# Patient Record
Sex: MALE | Race: WHITE | NOT HISPANIC OR LATINO | Employment: OTHER | URBAN - METROPOLITAN AREA
[De-identification: names, ages, dates, MRNs, and addresses within clinical notes are randomized per-mention and may not be internally consistent; named-entity substitution may affect disease eponyms.]

---

## 2017-09-13 ENCOUNTER — GENERIC CONVERSION - ENCOUNTER (OUTPATIENT)
Dept: OTHER | Facility: OTHER | Age: 72
End: 2017-09-13

## 2018-01-09 ENCOUNTER — APPOINTMENT (OUTPATIENT)
Dept: LAB | Facility: MEDICAL CENTER | Age: 73
End: 2018-01-09
Payer: COMMERCIAL

## 2018-01-09 ENCOUNTER — TRANSCRIBE ORDERS (OUTPATIENT)
Dept: ADMINISTRATIVE | Facility: HOSPITAL | Age: 73
End: 2018-01-09

## 2018-01-09 DIAGNOSIS — E03.9 MYXEDEMA HEART DISEASE: ICD-10-CM

## 2018-01-09 DIAGNOSIS — E78.2 MIXED HYPERLIPIDEMIA: ICD-10-CM

## 2018-01-09 DIAGNOSIS — I51.9 MYXEDEMA HEART DISEASE: ICD-10-CM

## 2018-01-09 DIAGNOSIS — E78.2 MIXED HYPERLIPIDEMIA: Primary | ICD-10-CM

## 2018-01-09 LAB
ALBUMIN SERPL BCP-MCNC: 3.8 G/DL (ref 3.5–5)
ALP SERPL-CCNC: 86 U/L (ref 46–116)
ALT SERPL W P-5'-P-CCNC: 25 U/L (ref 12–78)
ANION GAP SERPL CALCULATED.3IONS-SCNC: 4 MMOL/L (ref 4–13)
AST SERPL W P-5'-P-CCNC: 19 U/L (ref 5–45)
BILIRUB SERPL-MCNC: 0.48 MG/DL (ref 0.2–1)
BUN SERPL-MCNC: 15 MG/DL (ref 5–25)
CALCIUM SERPL-MCNC: 9.4 MG/DL (ref 8.3–10.1)
CHLORIDE SERPL-SCNC: 106 MMOL/L (ref 100–108)
CHOLEST SERPL-MCNC: 190 MG/DL (ref 50–200)
CO2 SERPL-SCNC: 30 MMOL/L (ref 21–32)
CREAT SERPL-MCNC: 1.37 MG/DL (ref 0.6–1.3)
GFR SERPL CREATININE-BSD FRML MDRD: 50 ML/MIN/1.73SQ M
GLUCOSE P FAST SERPL-MCNC: 94 MG/DL (ref 65–99)
HDLC SERPL-MCNC: 47 MG/DL (ref 40–60)
LDLC SERPL CALC-MCNC: 127 MG/DL (ref 0–100)
POTASSIUM SERPL-SCNC: 5 MMOL/L (ref 3.5–5.3)
PROT SERPL-MCNC: 7.5 G/DL (ref 6.4–8.2)
PSA SERPL-MCNC: 1 NG/ML (ref 0–4)
SODIUM SERPL-SCNC: 140 MMOL/L (ref 136–145)
TRIGL SERPL-MCNC: 82 MG/DL
TSH SERPL DL<=0.05 MIU/L-ACNC: 2.26 UIU/ML (ref 0.36–3.74)

## 2018-01-09 PROCEDURE — 84443 ASSAY THYROID STIM HORMONE: CPT

## 2018-01-09 PROCEDURE — 36415 COLL VENOUS BLD VENIPUNCTURE: CPT

## 2018-01-09 PROCEDURE — 80061 LIPID PANEL: CPT

## 2018-01-09 PROCEDURE — 80053 COMPREHEN METABOLIC PANEL: CPT

## 2018-01-09 PROCEDURE — G0103 PSA SCREENING: HCPCS

## 2018-01-15 NOTE — MISCELLANEOUS
Message   Recorded as Task   Date: 09/13/2017 10:17 AM, Created By: Isai Rdz   Task Name: Miscellaneous   Assigned To: Brian TIJERINA,TEAM   Regarding Patient: Dorys Stark, Status: Active   CommentDanilsa Rodriguez - 13 Sep 2017 10:17 AM     TASK CREATED  Caller: Self; Other; [phone] (Home)  ELIECER FROM [facility] REC[facility] DEPARTMENT CALLED SAYING SHE RECIEVED A REQUEST FROM ANGEL AND THE DATE OF BIRTH IS WRONG  PLEASE REFAX THE REQUEST WITH HIS CORRECT DATE OF BIRTH  Donavon Fontanez NUMBER [phone] IF ANY QUESTIONS CALL [phone]   Michelle Estrada - 13 Sep 2017 10:23 AM     TASK EDITED  RE-FAXED        Active Problems    1  Bladder cancer (188 9) (C67 9)   2  Incomplete emptying of bladder (788 21) (R33 9)    Current Meds   1  Synthroid 88 MCG Oral Tablet (Levothyroxine Sodium); Therapy: (Recorded:[id number]) to Recorded    Allergies    1   No Known Drug Allergies    Signatures   Electronically signed by : Jorden Velarde RN; Sep 13 2017 10:23AM EST                       (Author)

## 2018-03-02 ENCOUNTER — APPOINTMENT (OUTPATIENT)
Dept: LAB | Facility: MEDICAL CENTER | Age: 73
End: 2018-03-02
Attending: UROLOGY
Payer: COMMERCIAL

## 2018-03-02 ENCOUNTER — TRANSCRIBE ORDERS (OUTPATIENT)
Dept: ADMINISTRATIVE | Facility: HOSPITAL | Age: 73
End: 2018-03-02

## 2018-03-02 DIAGNOSIS — Z85.46 PERSONAL HISTORY OF MALIGNANT NEOPLASM OF PROSTATE: ICD-10-CM

## 2018-03-02 LAB — PSA SERPL-MCNC: 0.7 NG/ML (ref 0–4)

## 2018-03-02 PROCEDURE — 84153 ASSAY OF PSA TOTAL: CPT

## 2018-03-02 PROCEDURE — 36415 COLL VENOUS BLD VENIPUNCTURE: CPT

## 2018-03-13 DIAGNOSIS — Z85.46 PERSONAL HISTORY OF MALIGNANT NEOPLASM OF PROSTATE: ICD-10-CM

## 2018-03-13 RX ORDER — RANITIDINE 150 MG/1
150 TABLET ORAL
COMMUNITY
End: 2021-11-03 | Stop reason: ALTCHOICE

## 2018-03-13 RX ORDER — LEVOTHYROXINE SODIUM 88 UG/1
88 TABLET ORAL
COMMUNITY

## 2018-03-13 RX ORDER — PEDI MULTIVIT NO.91/IRON FUM 15 MG
1 TABLET,CHEWABLE ORAL
COMMUNITY
End: 2021-11-03 | Stop reason: ALTCHOICE

## 2018-03-15 ENCOUNTER — PROCEDURE VISIT (OUTPATIENT)
Dept: UROLOGY | Facility: MEDICAL CENTER | Age: 73
End: 2018-03-15
Payer: COMMERCIAL

## 2018-03-15 VITALS
HEIGHT: 70 IN | BODY MASS INDEX: 26.05 KG/M2 | DIASTOLIC BLOOD PRESSURE: 86 MMHG | WEIGHT: 182 LBS | SYSTOLIC BLOOD PRESSURE: 145 MMHG

## 2018-03-15 DIAGNOSIS — Z85.51 HISTORY OF BLADDER CANCER: Primary | ICD-10-CM

## 2018-03-15 DIAGNOSIS — Z85.46 HISTORY OF PROSTATE CANCER: ICD-10-CM

## 2018-03-15 LAB
SL AMB  POCT GLUCOSE, UA: ABNORMAL
SL AMB LEUKOCYTE ESTERASE,UA: ABNORMAL
SL AMB POCT BILIRUBIN,UA: ABNORMAL
SL AMB POCT BLOOD,UA: ABNORMAL
SL AMB POCT CLARITY,UA: CLEAR
SL AMB POCT COLOR,UA: YELLOW
SL AMB POCT KETONES,UA: ABNORMAL
SL AMB POCT NITRITE,UA: ABNORMAL
SL AMB POCT PH,UA: 5.5
SL AMB POCT SPECIFIC GRAVITY,UA: 1
SL AMB POCT URINE PROTEIN: ABNORMAL
SL AMB POCT UROBILINOGEN: 0.2

## 2018-03-15 PROCEDURE — 4040F PNEUMOC VAC/ADMIN/RCVD: CPT | Performed by: UROLOGY

## 2018-03-15 PROCEDURE — 81003 URINALYSIS AUTO W/O SCOPE: CPT | Performed by: UROLOGY

## 2018-03-15 PROCEDURE — 99214 OFFICE O/P EST MOD 30 MIN: CPT | Performed by: UROLOGY

## 2018-03-15 PROCEDURE — 52000 CYSTOURETHROSCOPY: CPT | Performed by: UROLOGY

## 2018-03-15 NOTE — PROGRESS NOTES
KEFLEX 500MG WAS GIVEN TO Pt  PRIOR TO CYSTO PROCEDURE  LOT# [id number]  EXP: 08/2019  NDC# 8013-6112-09  CONSENT FORM SIGNED

## 2018-03-15 NOTE — PROGRESS NOTES
Assessment/Plan:  1  Doing well  No recurrence of either condition  2   Cysto and PSA one year  No problem-specific Assessment & Plan notes found for this encounter  Diagnoses and all orders for this visit:    History of bladder cancer  -     POCT urine dip auto non-scope    History of prostate cancer  -     PSA Total, Diagnostic; Future    Other orders  -     levothyroxine 88 mcg tablet; Take 88 mcg by mouth  -     pediatric multivitamin-iron (POLY-VI-SOL with IRON) 15 MG chewable tablet; Chew 1 tablet  -     ranitidine (ZANTAC) 150 mg tablet; Take 150 mg by mouth          Subjective:      Patient ID: Kemp Meigs is a 74 y o  male  1   Now six years out from XRT for localized prostate cancer  Has done quite well  PSA 1 0 two months ago and then a second one was just this month that was one  0 7    Five years out from TUR bladder tumor superficial noninvasive  Has had no recurrences  No urinary symptoms at all decent stream control nocturia        The following portions of the patient's history were reviewed and updated as appropriate: allergies, current medications, past family history, past medical history, past social history, past surgical history and problem list     Review of Systems   All other systems reviewed and are negative  Objective:      /86 (BP Location: Left arm, Patient Position: Sitting)   Ht 5' 10" (1 778 m)   Wt 82 6 kg (182 lb)   BMI 26 11 kg/m²          Physical Exam   Constitutional: He appears well-developed and well-nourished  Pulmonary/Chest: Effort normal and breath sounds normal  No respiratory distress  He has no wheezes  Abdominal: Soft  Bowel sounds are normal  He exhibits no distension  There is no tenderness     Genitourinary: Testes normal and penis normal    Genitourinary Comments: Prostate minimally enlarged no nodules     Cystoscopy  Date/Time: 3/15/2018 1:43 PM  Performed by: Jason Grant  Authorized by: Jason Grant     Procedure details: cystoscopy    Additional Procedure Details: The patient was carefully  positioned supine on the examining table  Sterile preparation was performed on the urethra  Xylocaine jelly was instilled and left  Indwelling for the procedure  The 13 Macedonian flexible cystoscope was passed with the following findings:      Urethra:  No strictures    Prostate:  Mild enlargement and median lobe    Bladder:  Mild radiation vascular changes, mild trabeculations  No tumors or stones     Residual urine:  100 mL    Patient tolerated the procedure well and was escorted from the examining table

## 2018-03-15 NOTE — LETTER
March 15, 2018     [facility] [place] [id number]    Patient: Al Jose J   YOB: 1945   Date of Visit: 3/15/2018     Dear Dr Charlotte Osorio      Thank you for referring Jordyn Conner to me for evaluation  Below are the relevant portions of my assessment and plan of care  If you have questions, please do not hesitate to call me  I look forward to following Sharyn Campbell along with you           Sincerely,        Leni Stokes MD        CC: No Recipients    Progress Notes:

## 2018-07-09 ENCOUNTER — TRANSCRIBE ORDERS (OUTPATIENT)
Dept: ADMINISTRATIVE | Facility: HOSPITAL | Age: 73
End: 2018-07-09

## 2018-07-09 ENCOUNTER — APPOINTMENT (OUTPATIENT)
Dept: LAB | Facility: MEDICAL CENTER | Age: 73
End: 2018-07-09
Payer: COMMERCIAL

## 2018-07-09 DIAGNOSIS — K21.9 GASTROESOPHAGEAL REFLUX DISEASE WITHOUT ESOPHAGITIS: ICD-10-CM

## 2018-07-09 DIAGNOSIS — C61 MALIGNANT NEOPLASM PROSTATE (HCC): Primary | ICD-10-CM

## 2018-07-09 DIAGNOSIS — C61 MALIGNANT NEOPLASM PROSTATE (HCC): ICD-10-CM

## 2018-07-09 DIAGNOSIS — C67.9 MALIGNANT NEOPLASM OF URINARY BLADDER, UNSPECIFIED SITE (HCC): ICD-10-CM

## 2018-07-09 DIAGNOSIS — E03.9 HYPOTHYROIDISM, UNSPECIFIED TYPE: ICD-10-CM

## 2018-07-09 DIAGNOSIS — E78.5 HYPERLIPIDEMIA, UNSPECIFIED HYPERLIPIDEMIA TYPE: ICD-10-CM

## 2018-07-09 LAB
ALBUMIN SERPL BCP-MCNC: 3.8 G/DL (ref 3.5–5)
ALP SERPL-CCNC: 73 U/L (ref 46–116)
ALT SERPL W P-5'-P-CCNC: 23 U/L (ref 12–78)
ANION GAP SERPL CALCULATED.3IONS-SCNC: 8 MMOL/L (ref 4–13)
AST SERPL W P-5'-P-CCNC: 21 U/L (ref 5–45)
BASOPHILS # BLD AUTO: 0.04 THOUSANDS/ΜL (ref 0–0.1)
BASOPHILS NFR BLD AUTO: 1 % (ref 0–1)
BILIRUB SERPL-MCNC: 0.6 MG/DL (ref 0.2–1)
BUN SERPL-MCNC: 26 MG/DL (ref 5–25)
CALCIUM SERPL-MCNC: 8.8 MG/DL (ref 8.3–10.1)
CHLORIDE SERPL-SCNC: 105 MMOL/L (ref 100–108)
CHOLEST SERPL-MCNC: 192 MG/DL (ref 50–200)
CO2 SERPL-SCNC: 27 MMOL/L (ref 21–32)
CREAT SERPL-MCNC: 1.45 MG/DL (ref 0.6–1.3)
EOSINOPHIL # BLD AUTO: 0.11 THOUSAND/ΜL (ref 0–0.61)
EOSINOPHIL NFR BLD AUTO: 2 % (ref 0–6)
ERYTHROCYTE [DISTWIDTH] IN BLOOD BY AUTOMATED COUNT: 13.3 % (ref 11.6–15.1)
GFR SERPL CREATININE-BSD FRML MDRD: 47 ML/MIN/1.73SQ M
GLUCOSE P FAST SERPL-MCNC: 95 MG/DL (ref 65–99)
HCT VFR BLD AUTO: 41.1 % (ref 36.5–49.3)
HDLC SERPL-MCNC: 44 MG/DL (ref 40–60)
HGB BLD-MCNC: 13.1 G/DL (ref 12–17)
IMM GRANULOCYTES # BLD AUTO: 0.02 THOUSAND/UL (ref 0–0.2)
IMM GRANULOCYTES NFR BLD AUTO: 0 % (ref 0–2)
LDLC SERPL CALC-MCNC: 131 MG/DL (ref 0–100)
LYMPHOCYTES # BLD AUTO: 1.84 THOUSANDS/ΜL (ref 0.6–4.47)
LYMPHOCYTES NFR BLD AUTO: 33 % (ref 14–44)
MCH RBC QN AUTO: 31.6 PG (ref 26.8–34.3)
MCHC RBC AUTO-ENTMCNC: 31.9 G/DL (ref 31.4–37.4)
MCV RBC AUTO: 99 FL (ref 82–98)
MONOCYTES # BLD AUTO: 0.59 THOUSAND/ΜL (ref 0.17–1.22)
MONOCYTES NFR BLD AUTO: 11 % (ref 4–12)
NEUTROPHILS # BLD AUTO: 2.94 THOUSANDS/ΜL (ref 1.85–7.62)
NEUTS SEG NFR BLD AUTO: 53 % (ref 43–75)
NONHDLC SERPL-MCNC: 148 MG/DL
NRBC BLD AUTO-RTO: 0 /100 WBCS
PLATELET # BLD AUTO: 265 THOUSANDS/UL (ref 149–390)
PMV BLD AUTO: 11.7 FL (ref 8.9–12.7)
POTASSIUM SERPL-SCNC: 4.5 MMOL/L (ref 3.5–5.3)
PROT SERPL-MCNC: 7.1 G/DL (ref 6.4–8.2)
PSA SERPL-MCNC: 0.8 NG/ML (ref 0–4)
RBC # BLD AUTO: 4.14 MILLION/UL (ref 3.88–5.62)
SODIUM SERPL-SCNC: 140 MMOL/L (ref 136–145)
TRIGL SERPL-MCNC: 86 MG/DL
TSH SERPL DL<=0.05 MIU/L-ACNC: 0.71 UIU/ML (ref 0.36–3.74)
WBC # BLD AUTO: 5.54 THOUSAND/UL (ref 4.31–10.16)

## 2018-07-09 PROCEDURE — 80053 COMPREHEN METABOLIC PANEL: CPT

## 2018-07-09 PROCEDURE — 80061 LIPID PANEL: CPT

## 2018-07-09 PROCEDURE — 85025 COMPLETE CBC W/AUTO DIFF WBC: CPT

## 2018-07-09 PROCEDURE — 84443 ASSAY THYROID STIM HORMONE: CPT

## 2018-07-09 PROCEDURE — G0103 PSA SCREENING: HCPCS

## 2018-07-09 PROCEDURE — 36415 COLL VENOUS BLD VENIPUNCTURE: CPT

## 2019-03-27 ENCOUNTER — PROCEDURE VISIT (OUTPATIENT)
Dept: UROLOGY | Facility: MEDICAL CENTER | Age: 74
End: 2019-03-27
Payer: COMMERCIAL

## 2019-03-27 VITALS
SYSTOLIC BLOOD PRESSURE: 136 MMHG | HEIGHT: 70 IN | HEART RATE: 82 BPM | DIASTOLIC BLOOD PRESSURE: 82 MMHG | WEIGHT: 180 LBS | BODY MASS INDEX: 25.77 KG/M2

## 2019-03-27 DIAGNOSIS — N13.8 BPH WITH URINARY OBSTRUCTION: ICD-10-CM

## 2019-03-27 DIAGNOSIS — Z85.51 HISTORY OF BLADDER CANCER: Primary | ICD-10-CM

## 2019-03-27 DIAGNOSIS — N40.1 BPH WITH URINARY OBSTRUCTION: ICD-10-CM

## 2019-03-27 LAB
SL AMB  POCT GLUCOSE, UA: NEGATIVE
SL AMB LEUKOCYTE ESTERASE,UA: NEGATIVE
SL AMB POCT BILIRUBIN,UA: NEGATIVE
SL AMB POCT BLOOD,UA: NEGATIVE
SL AMB POCT CLARITY,UA: CLEAR
SL AMB POCT COLOR,UA: YELLOW
SL AMB POCT KETONES,UA: NEGATIVE
SL AMB POCT NITRITE,UA: NEGATIVE
SL AMB POCT PH,UA: 7
SL AMB POCT SPECIFIC GRAVITY,UA: 1.02
SL AMB POCT URINE PROTEIN: NEGATIVE
SL AMB POCT UROBILINOGEN: 0.2

## 2019-03-27 PROCEDURE — 81003 URINALYSIS AUTO W/O SCOPE: CPT | Performed by: UROLOGY

## 2019-03-27 PROCEDURE — 52000 CYSTOURETHROSCOPY: CPT | Performed by: UROLOGY

## 2019-03-27 PROCEDURE — 99213 OFFICE O/P EST LOW 20 MIN: CPT | Performed by: UROLOGY

## 2019-03-27 NOTE — LETTER
March 27, 2019     [facility] [place] [phone]    Patient: Zoila Sosa   YOB: 1945   Date of Visit: 3/27/2019       Dear Dr Sheela Ortega: Thank you for referring Kamila Yu to me for evaluation  Below are my notes for this consultation  If you have questions, please do not hesitate to call me  I look forward to following your patient along with you  Sincerely,        Adrain Phalen, MD        CC: No Recipients  Adrain Phalen, MD  3/27/2019  3:12 PM  Sign at close encounter  Assessment/Plan:  Cysto negative for bladder tumor recurrence, moderate BPH  Follow-up cysto one year, we will do this until it has been 10 years since the tumor  No problem-specific Assessment & Plan notes found for this encounter  Diagnoses and all orders for this visit:    History of bladder cancer  -     POCT urine dip auto non-scope    BPH with urinary obstruction          Subjective:      Patient ID: Zoila Sosa is a 74 y o  male  1  Follow-up for bladder cancer, 2012 had a superficial high-grade noninvasive tumor  Cysto does all negative to date since that time  2  Mild BPH, nocturia times 3-4, but he says he is not bothered by getting up at night  PSA 1 6 recently      The following portions of the patient's history were reviewed and updated as appropriate: allergies, current medications, past family history, past medical history, past social history, past surgical history and problem list     Review of Systems   All other systems reviewed and are negative  Objective:      /82 (BP Location: Left arm, Patient Position: Sitting, Cuff Size: Standard)   Pulse 82   Ht 5' 10" (1 778 m)   Wt 81 6 kg (180 lb)   BMI 25 83 kg/m²           Physical Exam   Constitutional: He is oriented to person, place, and time  He appears well-developed and well-nourished  No distress  HENT:   Head: Normocephalic and atraumatic     Eyes: Conjunctivae are normal  Cardiovascular: Normal rate and regular rhythm  Pulmonary/Chest: Effort normal and breath sounds normal  No respiratory distress  He has no wheezes  Abdominal: Soft  Bowel sounds are normal  He exhibits no distension and no mass  There is no tenderness  Genitourinary:   Genitourinary Comments: Penis and testes normal   Neurological: He is alert and oriented to person, place, and time  Skin: Skin is warm and dry  He is not diaphoretic  Cystoscopy  Date/Time: 3/27/2019 3:12 PM  Performed by: John Moore MD  Authorized by: John Moore MD     Procedure details: cystoscopy    Additional Procedure Details: The patient was carefully  positioned supine on the examining table  Sterile preparation was performed on the urethra  Xylocaine jelly was instilled and left  Indwelling for the procedure  The 13 Ugandan flexible cystoscope was passed with the following findings:      Urethra:  No strictures    Prostate: Moderate enlargement, minimal median lobe    Bladder:  Severe trabeculations, no lesions tumors stones     Residual urine:  100 mL    Patient tolerated the procedure well and was escorted from the examining table

## 2021-03-31 ENCOUNTER — IMMUNIZATIONS (OUTPATIENT)
Dept: FAMILY MEDICINE CLINIC | Facility: HOSPITAL | Age: 76
End: 2021-03-31

## 2021-03-31 DIAGNOSIS — Z23 ENCOUNTER FOR IMMUNIZATION: Primary | ICD-10-CM

## 2021-03-31 PROCEDURE — 91301 SARS-COV-2 / COVID-19 MRNA VACCINE (MODERNA) 100 MCG: CPT

## 2021-03-31 PROCEDURE — 0011A SARS-COV-2 / COVID-19 MRNA VACCINE (MODERNA) 100 MCG: CPT

## 2021-04-30 ENCOUNTER — IMMUNIZATIONS (OUTPATIENT)
Dept: FAMILY MEDICINE CLINIC | Facility: HOSPITAL | Age: 76
End: 2021-04-30

## 2021-04-30 DIAGNOSIS — Z23 ENCOUNTER FOR IMMUNIZATION: Primary | ICD-10-CM

## 2021-04-30 PROCEDURE — 91301 SARS-COV-2 / COVID-19 MRNA VACCINE (MODERNA) 100 MCG: CPT

## 2021-04-30 PROCEDURE — 0012A SARS-COV-2 / COVID-19 MRNA VACCINE (MODERNA) 100 MCG: CPT

## 2021-11-08 ENCOUNTER — PROCEDURE VISIT (OUTPATIENT)
Dept: UROLOGY | Facility: MEDICAL CENTER | Age: 76
End: 2021-11-08
Payer: COMMERCIAL

## 2021-11-08 VITALS
DIASTOLIC BLOOD PRESSURE: 80 MMHG | HEIGHT: 70 IN | WEIGHT: 179.2 LBS | SYSTOLIC BLOOD PRESSURE: 140 MMHG | BODY MASS INDEX: 25.65 KG/M2 | HEART RATE: 74 BPM

## 2021-11-08 DIAGNOSIS — Z85.46 HISTORY OF PROSTATE CANCER: ICD-10-CM

## 2021-11-08 DIAGNOSIS — Z85.51 HISTORY OF BLADDER CANCER: Primary | ICD-10-CM

## 2021-11-08 LAB
SL AMB  POCT GLUCOSE, UA: ABNORMAL
SL AMB LEUKOCYTE ESTERASE,UA: ABNORMAL
SL AMB POCT BILIRUBIN,UA: ABNORMAL
SL AMB POCT BLOOD,UA: ABNORMAL
SL AMB POCT CLARITY,UA: CLEAR
SL AMB POCT COLOR,UA: YELLOW
SL AMB POCT KETONES,UA: ABNORMAL
SL AMB POCT NITRITE,UA: ABNORMAL
SL AMB POCT PH,UA: 5.5
SL AMB POCT SPECIFIC GRAVITY,UA: 1.03
SL AMB POCT URINE PROTEIN: ABNORMAL
SL AMB POCT UROBILINOGEN: 0.2

## 2021-11-08 PROCEDURE — 99214 OFFICE O/P EST MOD 30 MIN: CPT | Performed by: UROLOGY

## 2021-11-08 PROCEDURE — 52000 CYSTOURETHROSCOPY: CPT | Performed by: UROLOGY

## 2021-11-08 PROCEDURE — 81003 URINALYSIS AUTO W/O SCOPE: CPT | Performed by: UROLOGY

## 2022-02-09 ENCOUNTER — TELEPHONE (OUTPATIENT)
Dept: UROLOGY | Facility: MEDICAL CENTER | Age: 77
End: 2022-02-09

## 2022-02-09 NOTE — TELEPHONE ENCOUNTER
I called patient twice about his PSA results, then left a message  PSA is now 2 5, was last 1 8 in October 2021     I went over the options again as we did in the office  Certainly reasonable to consider observation and check the PSA again in six months  The other option is  Lupron medication    Patient knows to call me if he wishes to discuss further

## 2022-02-10 NOTE — TELEPHONE ENCOUNTER
Pt is requesting a call back to request a phone consult with Dr Lorrie Elizondo today to discus the below PSA results  Nina Friedman MD    I called patient twice about his PSA results, then left a message  PSA is now 2 5, was last 1 8 in October 2021      I went over the options again as we did in the office  Certainly reasonable to consider observation and check the PSA again in six months  The other option is  Lupron medication    Patient knows to call me if he wishes to discuss further

## 2022-02-23 NOTE — TELEPHONE ENCOUNTER
Patient called stating he wants to start with the lupron injections  Please call him back to see when can he start

## 2022-02-25 ENCOUNTER — TELEPHONE (OUTPATIENT)
Dept: OTHER | Facility: OTHER | Age: 77
End: 2022-02-25

## 2022-02-25 NOTE — TELEPHONE ENCOUNTER
Duplicate encounter  See encounter from 2/9/22  Called and message sent on prior call/message trail

## 2022-02-28 NOTE — TELEPHONE ENCOUNTER
Patient followed by Dr Amara Al called and stated he cannot wait until May to start his hormone therapy / Lupron  Scheduled patient on March 14th at 0930 to discuss and possibly start Lupron

## 2022-03-14 ENCOUNTER — TELEPHONE (OUTPATIENT)
Dept: UROLOGY | Facility: MEDICAL CENTER | Age: 77
End: 2022-03-14

## 2022-03-14 ENCOUNTER — OFFICE VISIT (OUTPATIENT)
Dept: UROLOGY | Facility: MEDICAL CENTER | Age: 77
End: 2022-03-14
Payer: COMMERCIAL

## 2022-03-14 VITALS
WEIGHT: 186.2 LBS | SYSTOLIC BLOOD PRESSURE: 112 MMHG | DIASTOLIC BLOOD PRESSURE: 80 MMHG | BODY MASS INDEX: 26.66 KG/M2 | HEIGHT: 70 IN | HEART RATE: 64 BPM

## 2022-03-14 DIAGNOSIS — C61 MALIGNANT NEOPLASM OF PROSTATE (HCC): Primary | ICD-10-CM

## 2022-03-14 DIAGNOSIS — N39.41 URGE INCONTINENCE: ICD-10-CM

## 2022-03-14 PROCEDURE — 96402 CHEMO HORMON ANTINEOPL SQ/IM: CPT

## 2022-03-14 PROCEDURE — 99214 OFFICE O/P EST MOD 30 MIN: CPT | Performed by: UROLOGY

## 2022-03-14 RX ORDER — ATORVASTATIN CALCIUM 10 MG/1
TABLET, FILM COATED ORAL
COMMUNITY
Start: 2022-02-14

## 2022-03-14 RX ORDER — FAMOTIDINE 10 MG
10 TABLET ORAL 2 TIMES DAILY
COMMUNITY

## 2022-03-14 RX ORDER — CHOLECALCIFEROL (VITAMIN D3) 125 MCG
5 CAPSULE ORAL AS NEEDED
COMMUNITY

## 2022-03-14 NOTE — PROGRESS NOTES
HISTORY:    Follow-up:     Follow-up for bladder cancer, 2012 had a superficial high-grade noninvasive tumor   Cysto all negative to date since that time      2  Mild BPH, nocturia times 3-4, but he says he is not bothered by getting up at night      3  Prostate cancer, radiation in early 2012  PSA rising slowly the past couple years  PSA 2 5 in January 2022  1 8 in October 2021  1 4 in January 2021  1 1 in March 2019 prostate cancer, with PSA rising over the past year or so           ASSESSMENT / PLAN:    We had discussed this on the phone last month, patient decided he did one Lupron  First shot given today  He is aware of the side effects, he had Lupron for two years before and after his radiation back 2012  Check PSA in three months     Next visit will be three months after that, with me and the Lupron shot     The following portions of the patient's history were reviewed and updated as appropriate: allergies, current medications, past family history, past medical history, past social history, past surgical history and problem list     Review of Systems   All other systems reviewed and are negative  Objective:     Physical Exam  Constitutional:       General: He is not in acute distress  Appearance: He is well-developed  He is not diaphoretic  HENT:      Head: Normocephalic and atraumatic  Eyes:      General: No scleral icterus  Pulmonary:      Effort: Pulmonary effort is normal    Skin:     Coloration: Skin is not pale  Neurological:      Mental Status: He is alert and oriented to person, place, and time  Psychiatric:         Behavior: Behavior normal          Thought Content:  Thought content normal          Judgment: Judgment normal            0   Lab Value Date/Time    PSA 0 8 07/09/2018 0651    PSA 0 7 03/02/2018 0834    PSA 1 0 01/09/2018 0903   ]  BUN   Date Value Ref Range Status   07/09/2018 26 (H) 5 - 25 mg/dL Final     Creatinine   Date Value Ref Range Status 07/09/2018 1 45 (H) 0 60 - 1 30 mg/dL Final     Comment:     Standardized to IDMS reference method     No components found for: CBC      Patient Active Problem List   Diagnosis    History of prostate cancer    History of bladder cancer    BPH with urinary obstruction    Urge incontinence        Diagnoses and all orders for this visit:    Malignant neoplasm of prostate ([address] )  -     leuprolide (LUPRON DEPOT 6 MONTH KIT) IM injection kit 45 mg    Urge incontinence    Other orders  -     atorvastatin (LIPITOR) 10 mg tablet  -     famotidine (PEPCID) 10 mg tablet; Take 10 mg by mouth 2 (two) times a day  -     Melatonin 5 MG TABS; Take 5 mg by mouth as needed As need it for sleep           Patient ID: Arnaud Aguirre is a 64 y o  male  Current Outpatient Medications:     atorvastatin (LIPITOR) 10 mg tablet, , Disp: , Rfl:     famotidine (PEPCID) 10 mg tablet, Take 10 mg by mouth 2 (two) times a day, Disp: , Rfl:     levothyroxine 88 mcg tablet, Take 88 mcg by mouth, Disp: , Rfl:     Melatonin 5 MG TABS, Take 5 mg by mouth as needed As need it for sleep, Disp: , Rfl:     valsartan (DIOVAN) 40 mg tablet, Take 40 mg by mouth daily, Disp: , Rfl:     Multiple Vitamin (MULTIVITAMIN ADULT PO), Take 1 tablet by mouth daily (Patient not taking: Reported on 11/5/2021), Disp: , Rfl:     Past Medical History:   Diagnosis Date    Asymptomatic microscopic hematuria     Feeling of incomplete bladder emptying     High serum creatinine     Hypertension     Hypothyroid     Malignant neoplasm of lateral wall of urinary bladder [facility] [address]     Malignant neoplasm of prostate ([address] )     Nocturia     Other microscopic hematuria     Poor urinary stream        Past Surgical History:   Procedure Laterality Date    CATARACT EXTRACTION, BILATERAL Bilateral 10/2018, 11/2018    COLONOSCOPY  04/2013    Dr Gino Roach  Tubulovillous adenoma removed from the right colon    COLONOSCOPY  05/2016    Dr Radha Sheppard    Diverticulosis    EGD 05/2016    grade B esophagitis, 3 cm hiatal hernia, duodenal erosions    Gastric biopsy negative for H  pylori    HAND TENDON SURGERY Right     INGUINAL HERNIA REPAIR Bilateral     SKIN CANCER EXCISION      basal cell carcinoma    TRANSURETHRAL NEEDLE ABLATION OF THE PROSTATE      TRANSURETHRAL RESECTION OF PROSTATE         Social History

## 2022-09-15 ENCOUNTER — OFFICE VISIT (OUTPATIENT)
Dept: UROLOGY | Facility: MEDICAL CENTER | Age: 77
End: 2022-09-15
Payer: COMMERCIAL

## 2022-09-15 ENCOUNTER — TELEPHONE (OUTPATIENT)
Dept: UROLOGY | Facility: MEDICAL CENTER | Age: 77
End: 2022-09-15

## 2022-09-15 VITALS
DIASTOLIC BLOOD PRESSURE: 80 MMHG | HEIGHT: 70 IN | SYSTOLIC BLOOD PRESSURE: 140 MMHG | HEART RATE: 64 BPM | WEIGHT: 184 LBS | BODY MASS INDEX: 26.34 KG/M2

## 2022-09-15 DIAGNOSIS — Z85.51 HISTORY OF BLADDER CANCER: ICD-10-CM

## 2022-09-15 DIAGNOSIS — C61 MALIGNANT NEOPLASM OF PROSTATE (HCC): Primary | ICD-10-CM

## 2022-09-15 DIAGNOSIS — R23.2 HOT FLASHES: ICD-10-CM

## 2022-09-15 DIAGNOSIS — Z79.818 ANDROGEN DEPRIVATION THERAPY: ICD-10-CM

## 2022-09-15 PROCEDURE — 96402 CHEMO HORMON ANTINEOPL SQ/IM: CPT

## 2022-09-15 PROCEDURE — 99213 OFFICE O/P EST LOW 20 MIN: CPT | Performed by: UROLOGY

## 2022-09-15 RX ORDER — MEGESTROL ACETATE 20 MG/1
20 TABLET ORAL DAILY
Qty: 90 TABLET | Refills: 3 | Status: SHIPPED | OUTPATIENT
Start: 2022-09-15

## 2022-09-15 NOTE — PROGRESS NOTES
HISTORY:    Follow-up prostate cancer, started Lupron in March 2022  Tolerates it well  Prior info: Follow-up for bladder cancer, 2012 had a superficial high-grade noninvasive tumor   Cysto all negative to date since that time      2  Mild BPH, nocturia times 3-4, but he says he is not bothered by getting up at night      3  Prostate cancer, radiation in early 2012  PSA rising slowly the past couple years  PSA 0 9 in April 2022, one month after Lupron  PSA 2 5 in January 2022  1 8 in October 2021  1 4 in January 2021  1 1 in March 2019 prostate cancer, with PSA rising over the past year or so            ASSESSMENT / PLAN:    Lupron given today    Check PSA    Follow-up six months for next Lupron    The following portions of the patient's history were reviewed and updated as appropriate: allergies, current medications, past family history, past medical history, past social history, past surgical history and problem list     Review of Systems   All other systems reviewed and are negative  Objective:     Physical Exam  Constitutional:       General: He is not in acute distress  Appearance: He is well-developed  He is not diaphoretic  HENT:      Head: Normocephalic and atraumatic  Eyes:      General: No scleral icterus  Pulmonary:      Effort: Pulmonary effort is normal    Genitourinary:     Comments: Penis testes normal  Skin:     Coloration: Skin is not pale  Neurological:      Mental Status: He is alert and oriented to person, place, and time  Psychiatric:         Behavior: Behavior normal          Thought Content:  Thought content normal          Judgment: Judgment normal            0   Lab Value Date/Time    PSA 0 8 07/09/2018 0651    PSA 0 7 03/02/2018 0834    PSA 1 0 01/09/2018 0903   ]  BUN   Date Value Ref Range Status   07/09/2018 26 (H) 5 - 25 mg/dL Final     Creatinine   Date Value Ref Range Status   07/09/2018 1 45 (H) 0 60 - 1 30 mg/dL Final     Comment: Standardized to IDMS reference method     No components found for: CBC      Patient Active Problem List   Diagnosis    History of prostate cancer    History of bladder cancer    BPH with urinary obstruction    Urge incontinence        Diagnoses and all orders for this visit:    Malignant neoplasm of prostate ([facility])  -     leuprolide (LUPRON DEPOT 6 MONTH KIT) IM injection kit 45 mg  -     PSA Total, Diagnostic; Future    History of bladder cancer  -     Urinalysis with microscopic    Androgen deprivation therapy    Hot flashes  -     megestrol (MEGACE) 20 mg tablet; Take 1 tablet (20 mg total) by mouth daily           Patient ID: Temi Villaseñor is a 76 y o  male        Current Outpatient Medications:     famotidine (PEPCID) 10 mg tablet, Take 10 mg by mouth 2 (two) times a day, Disp: , Rfl:     levothyroxine 88 mcg tablet, Take 88 mcg by mouth, Disp: , Rfl:     valsartan (DIOVAN) 40 mg tablet, Take 40 mg by mouth daily, Disp: , Rfl:     atorvastatin (LIPITOR) 10 mg tablet, , Disp: , Rfl:     Melatonin 5 MG TABS, Take 5 mg by mouth as needed As need it for sleep (Patient not taking: Reported on 9/15/2022), Disp: , Rfl:     Multiple Vitamin (MULTIVITAMIN ADULT PO), Take 1 tablet by mouth daily (Patient not taking: No sig reported), Disp: , Rfl:     Current Facility-Administered Medications:     leuprolide (LUPRON DEPOT 6 MONTH KIT) IM injection kit 45 mg, 45 mg, Intramuscular, Once, Felisha Chatman MD    Past Medical History:   Diagnosis Date    Asymptomatic microscopic hematuria     Feeling of incomplete bladder emptying     High serum creatinine     Hypertension     Hypothyroid     Malignant neoplasm of lateral wall of urinary bladder ([address] )     Malignant neoplasm of prostate ([address] )     Nocturia     Other microscopic hematuria     Poor urinary stream        Past Surgical History:   Procedure Laterality Date    CATARACT EXTRACTION, BILATERAL Bilateral 10/2018, 11/2018    COLONOSCOPY  04/2013      [facility]  Tubulovillous adenoma removed from the right colon    COLONOSCOPY  05/2016    Dr Star Valera  Diverticulosis    EGD  05/2016    grade B esophagitis, 3 cm hiatal hernia, duodenal erosions    Gastric biopsy negative for H  pylori    HAND TENDON SURGERY Right     INGUINAL HERNIA REPAIR Bilateral     SKIN CANCER EXCISION      basal cell carcinoma    TRANSURETHRAL NEEDLE ABLATION OF THE PROSTATE      TRANSURETHRAL RESECTION OF PROSTATE         Social History

## 2022-09-15 NOTE — PATIENT INSTRUCTIONS
Start taking Megace once per day  If after one month it is not helping enough, take it twice per day

## 2022-09-15 NOTE — TELEPHONE ENCOUNTER
LUPRON 45mg - [employer] - NO authorization needed  Office stock okay to use  *VLD 9/15/22    Also, Excel spreadsheet note for upcoming Lupron injection scheduled for DOS 3/16/23  No further action required at this time

## 2022-09-15 NOTE — TELEPHONE ENCOUNTER
Good morning Joann  This patient is coming today for a Lupron, with Dr Simon Points  I see that a Lupron Leatha Terry was previously done in 3/1/22 by AR  Do we need an updated authorization, for todays Lupron?

## 2022-12-07 ENCOUNTER — OFFICE VISIT (OUTPATIENT)
Dept: URGENT CARE | Facility: MEDICAL CENTER | Age: 77
End: 2022-12-07

## 2022-12-07 VITALS
HEART RATE: 80 BPM | SYSTOLIC BLOOD PRESSURE: 156 MMHG | OXYGEN SATURATION: 100 % | TEMPERATURE: 98.5 F | DIASTOLIC BLOOD PRESSURE: 89 MMHG | RESPIRATION RATE: 18 BRPM

## 2022-12-07 DIAGNOSIS — I73.9 CLAUDICATION, INTERMITTENT (HCC): Primary | ICD-10-CM

## 2022-12-07 NOTE — PATIENT INSTRUCTIONS
Follow up with PCP as soon as possible for further workup  Continue to rest the area - elevation, alternate ice and heat  Activity as tolerated  Continue with OTC Aleve or other NSAIDs (ex: ibuprofen) for the pain  Peripheral Vascular Disease   AMBULATORY CARE:   Peripheral vascular disease (PVD)  is a condition that causes decreased blood flow to your limbs because of blocked blood vessels  The blockage is usually caused by material such as cholesterol or a blood clot that sticks to the blood vessels and makes them narrow  Common symptoms include the following:   Painful cramps in your hip, thigh, or calf muscles, especially after you walk or climb stairs    Burning pain in your hands, fingers, feet, or toes    Shiny, tight, cold skin, and uneven hair growth on your skin    A change in your skin color    Sores on your skin that do not heal    Weakness or numbness of your hands or feet    Call your local emergency number (911 in the [place]) for any of the following: You have any of the following signs of a heart attack:      Squeezing, pressure, or pain in your chest    You may  also have any of the following:     Discomfort or pain in your back, neck, jaw, stomach, or arm    Shortness of breath    Nausea or vomiting    Lightheadedness or a sudden cold sweat    You have any of the following signs of a stroke:      Numbness or drooping on one side of your face     Weakness in an arm or leg    Confusion or difficulty speaking    Dizziness, a severe headache, or vision loss    Seek care immediately if:   Your arm or leg feels warm, tender, and painful  It may look swollen and red  You have leg pain that does not go away with rest     You have dark areas on the skin of your legs  You cannot see out of one or both eyes  Call your doctor if:   Your signs and symptoms get worse or do not get better, even after treatment  You have a sore or ulcer that is not healing or gets worse      You have questions or concerns about your condition or care  Treatment for PVD  may include any of the following:  Medicines  may be given to help decrease your cholesterol level, open blood vessels, or prevent blood clots  Procedures  may be used to open blocked blood vessels  Metal or plastic stents (tubes) may be put in where the artery was blocked to keep it open  Surgery may be used to place a new blood vessel near the blocked vessel  Surgery may be used to replace the area of the blood vessel  Manage PVD:   Do not smoke  Nicotine and other chemicals in cigarettes and cigars can damage your blood vessels  Ask your healthcare provider for information if you currently smoke and need help to quit  E-cigarettes or smokeless tobacco still contain nicotine  Talk to your healthcare provider before you use these products  Exercise as directed  Your healthcare provider can help you create a safe exercise plan  He or she may recommend walking  Walking is a low-impact way to exercise and increase your blood flow  Stop and rest if you have pain in your legs  Care for your feet  Look closely at your feet every day  Check for cracks or sores  Wash your feet daily with mild soap and dry them well  Do not walk barefoot in case you step on a hard or sharp object  Change your sleep position  You may have pain in your legs or feet when you sleep  Raise the head of your bed 4 inches, or use pillows to prop your upper body higher than your legs  This may help more blood go to your feet, decreasing pain  Protect and cushion your feet and hands  If you have ulcers on your feet, you may need to wear bandages with heel pads  You may also wear foam rubber booties  Hand or foot warmers may decrease pain in your hands or feet  Prevent PVD:   Eat a variety of healthy foods  Healthy foods include fruits, vegetables, whole-grain breads, low-fat dairy products, beans, lean meats, and fish   Ask if you need to be on a special diet  Maintain a healthy weight  Ask your healthcare provider what a healthy weight is for you  Ask him or her to help you create a weight loss plan if you are overweight  Manage diabetes  Keep your blood sugar level in the range recommended by your healthcare provider  Check your blood sugar level as often as directed  Ask your provider if you should make nutrition, exercise, or medicine changes  Follow up with your doctor as directed:  Write down your questions so you remember to ask them during your visits  © Copyright [employer] 2022 Information is for End User's use only and may not be sold, redistributed or otherwise used for commercial purposes  All illustrations and images included in CareNotes® are the copyrighted property of A D A M , Inc  or [facility] Adiel Marquez   The above information is an  only  It is not intended as medical advice for individual conditions or treatments  Talk to your doctor, nurse or pharmacist before following any medical regimen to see if it is safe and effective for you

## 2022-12-07 NOTE — PROGRESS NOTES
[address] [employer] [facility]        NAME: Emeka Deutsch is a 76 y o  male  : 1945    MRN: [id number]  DATE: 2022  TIME: 10:52 AM    Assessment and Plan   Claudication, intermittent ([address] ) [I73 9]  1  Claudication, intermittent ([address] )             Follow up with PCP as soon as possible for further workup  Continue to rest the area - elevation, alternate ice and heat  Activity as tolerated  Continue with OTC Aleve or other NSAIDs (ex: ibuprofen) for the pain  Patient Instructions       Follow up with PCP in 3-5 days  Proceed to  ER if symptoms worsen  Chief Complaint     Chief Complaint   Patient presents with   • Foot Pain     Patient c/o left foot pain x 5 day s         History of Present Illness       Patient presents complaining of a throbbing pain in the left lateral top of his foot radiating up his ankle to mid-calf  He states the pain started 4 days ago in the morning, he woke up with a cramping sensation in the outer part of his left calf, which went away after walking around for about an hour  The same thing happened the next morning  2 days ago he had a 6/10 pain with walking that was present the whole day, starting in the area of his left midfoot on the top and radiating up to mid-calf  The pain is decreased to the point where it is almost diminished with rest  This morning he tried wrapping it with an ace bandage and walking on it, he then had severe pain  He took Aleve which helped diminish the pain almost completely  There is no pain with movement of the ankle/foot or palpation, just with weight-bearing  There is a slight swelling to the area, but no bruising  He denies any trauma or injury, redness, warmth, history of blood clots or circulation issues  He is using a cane to walk around today due to the pain with walking, but does not normally use one  Review of Systems   Review of Systems   Constitutional: Negative for fever  Respiratory: Negative for shortness of breath  Cardiovascular: Positive for leg swelling (Left lower leg)  Negative for chest pain and palpitations  Musculoskeletal: Positive for gait problem (Due to pain), joint swelling and myalgias (Lower left leg)  Skin: Negative for color change, pallor and wound  Current Medications       Current Outpatient Medications:   •  atorvastatin (LIPITOR) 10 mg tablet, , Disp: , Rfl:   •  famotidine (PEPCID) 10 mg tablet, Take 10 mg by mouth 2 (two) times a day, Disp: , Rfl:   •  levothyroxine 88 mcg tablet, Take 88 mcg by mouth, Disp: , Rfl:   •  megestrol (MEGACE) 20 mg tablet, Take 1 tablet (20 mg total) by mouth daily, Disp: 90 tablet, Rfl: 3  •  Melatonin 5 MG TABS, Take 5 mg by mouth as needed As need it for sleep (Patient not taking: Reported on 9/15/2022), Disp: , Rfl:   •  Multiple Vitamin (MULTIVITAMIN ADULT PO), Take 1 tablet by mouth daily (Patient not taking: No sig reported), Disp: , Rfl:   •  valsartan (DIOVAN) 40 mg tablet, Take 40 mg by mouth daily, Disp: , Rfl:     Current Allergies     Allergies as of 12/07/2022   • (No Known Allergies)            The following portions of the patient's history were reviewed and updated as appropriate: allergies, current medications, past family history, past medical history, past social history, past surgical history and problem list      Past Medical History:   Diagnosis Date   • Asymptomatic microscopic hematuria    • Feeling of incomplete bladder emptying    • High serum creatinine    • Hypertension    • Hypothyroid    • Malignant neoplasm of lateral wall of urinary bladder ([address] )    • Malignant neoplasm of prostate ([address] )    • Nocturia    • Other microscopic hematuria    • Poor urinary stream        Past Surgical History:   Procedure Laterality Date   • CATARACT EXTRACTION, BILATERAL Bilateral 10/2018, 11/2018   • COLONOSCOPY  04/2013    Dr Kessler [facility]  Tubulovillous adenoma removed from the right colon   • COLONOSCOPY  05/2016    Dr Stanford Castleman    Diverticulosis   • EGD 05/2016    grade B esophagitis, 3 cm hiatal hernia, duodenal erosions  Gastric biopsy negative for H  pylori   • HAND TENDON SURGERY Right    • INGUINAL HERNIA REPAIR Bilateral    • SKIN CANCER EXCISION      basal cell carcinoma   • TRANSURETHRAL NEEDLE ABLATION OF THE PROSTATE     • TRANSURETHRAL RESECTION OF PROSTATE         Family History   Problem Relation Age of Onset   • Stroke Mother    • Bone cancer Father          Medications have been verified  Objective   /89   Pulse 80   Temp 98 5 °F (36 9 °C)   Resp 18   SpO2 100%        Physical Exam     Physical Exam  Vitals and nursing note reviewed  Constitutional:       General: He is not in acute distress  Appearance: Normal appearance  He is not ill-appearing  Cardiovascular:      Rate and Rhythm: Normal rate and regular rhythm  Pulses: Normal pulses  Heart sounds: Normal heart sounds  Pulmonary:      Effort: Pulmonary effort is normal       Breath sounds: Normal breath sounds  Musculoskeletal:      Left lower leg: Swelling present  No deformity or tenderness  1+ Edema present  Left ankle: Swelling present  No deformity or ecchymosis  No tenderness  Normal range of motion  Normal pulse  Left foot: Normal range of motion and normal capillary refill  Swelling present  No deformity or tenderness  Normal pulse  Comments: DP pulse present, strong  PT pulse hard to palpate due to swelling in the area  Skin warm to touch  Skin:     General: Skin is warm and dry  Neurological:      Mental Status: He is alert

## 2023-03-06 DIAGNOSIS — Z85.46 HISTORY OF PROSTATE CANCER: Primary | ICD-10-CM

## 2023-03-16 ENCOUNTER — OFFICE VISIT (OUTPATIENT)
Dept: UROLOGY | Facility: MEDICAL CENTER | Age: 78
End: 2023-03-16

## 2023-03-16 ENCOUNTER — TELEPHONE (OUTPATIENT)
Dept: UROLOGY | Facility: MEDICAL CENTER | Age: 78
End: 2023-03-16

## 2023-03-16 VITALS
HEART RATE: 85 BPM | OXYGEN SATURATION: 98 % | WEIGHT: 190 LBS | DIASTOLIC BLOOD PRESSURE: 85 MMHG | SYSTOLIC BLOOD PRESSURE: 138 MMHG | BODY MASS INDEX: 27.26 KG/M2

## 2023-03-16 DIAGNOSIS — C61 PROSTATE CANCER (HCC): Primary | ICD-10-CM

## 2023-03-16 DIAGNOSIS — Z79.818 ANDROGEN DEPRIVATION THERAPY: ICD-10-CM

## 2023-03-16 DIAGNOSIS — Z85.51 HISTORY OF BLADDER CANCER: ICD-10-CM

## 2023-03-16 NOTE — PROGRESS NOTES
HISTORY:    Follow-up prostate cancer, started Lupron in March 2022  Tolerates it well         Follow-up for bladder cancer, 2012 had a superficial high-grade noninvasive tumor   Cysto all negative to date since that time      2  Mild BPH, nocturia times 3-4, but he says he is not bothered by getting up at night      3  Prostate cancer, radiation in early 2012      PSA 0 06 in September 2022  0 9 in April 2022, one month after Lupron  PSA 2 5 in January 2022  1 8 in October 2021  1 4 in January 2021  1 1 in March 2019 prostate cancer, with PSA rising over the past year or so         ASSESSMENT / PLAN:    Doing well  Lupron given today  Does not need cystoscopy  Follow-up Lupron 6 months with nurse    The following portions of the patient's history were reviewed and updated as appropriate: allergies, current medications, past family history, past medical history, past social history, past surgical history and problem list     Review of Systems   All other systems reviewed and are negative  Objective:     Physical Exam  Constitutional:       Appearance: Normal appearance  Neurological:      Mental Status: He is alert  0   Lab Value Date/Time    PSA 0 8 07/09/2018 0651    PSA 0 7 03/02/2018 0834    PSA 1 0 01/09/2018 0903   ]  BUN   Date Value Ref Range Status   07/09/2018 26 (H) 5 - 25 mg/dL Final     Creatinine   Date Value Ref Range Status   07/09/2018 1 45 (H) 0 60 - 1 30 mg/dL Final     Comment:     Standardized to IDMS reference method     No components found for: CBC      Patient Active Problem List   Diagnosis   • History of prostate cancer   • History of bladder cancer   • BPH with urinary obstruction   • Urge incontinence        Diagnoses and all orders for this visit:    Prostate cancer ([facility] [address] )  -     leuprolide (LUPRON DEPOT 6 MONTH KIT) IM injection kit 45 mg  -     PSA Total, Diagnostic;  Future    History of bladder cancer    Androgen deprivation therapy           Patient ID: Temi Villaseñor is a 76 y o  male  Current Outpatient Medications:   •  levothyroxine 88 mcg tablet, Take 88 mcg by mouth, Disp: , Rfl:   •  megestrol (MEGACE) 20 mg tablet, Take 1 tablet (20 mg total) by mouth daily, Disp: 90 tablet, Rfl: 3  •  valsartan (DIOVAN) 40 mg tablet, Take 40 mg by mouth daily, Disp: , Rfl:   •  atorvastatin (LIPITOR) 10 mg tablet, , Disp: , Rfl:   •  famotidine (PEPCID) 10 mg tablet, Take 10 mg by mouth 2 (two) times a day (Patient not taking: Reported on 3/16/2023), Disp: , Rfl:   •  Melatonin 5 MG TABS, Take 5 mg by mouth as needed As need it for sleep (Patient not taking: Reported on 9/15/2022), Disp: , Rfl:   •  Multiple Vitamin (MULTIVITAMIN ADULT PO), Take 1 tablet by mouth daily (Patient not taking: No sig reported), Disp: , Rfl:     Past Medical History:   Diagnosis Date   • Asymptomatic microscopic hematuria    • Feeling of incomplete bladder emptying    • High serum creatinine    • Hypertension    • Hypothyroid    • Malignant neoplasm of lateral wall of urinary bladder (HCC)    • Malignant neoplasm of prostate (HCC)    • Nocturia    • Other microscopic hematuria    • Poor urinary stream        Past Surgical History:   Procedure Laterality Date   • CATARACT EXTRACTION, BILATERAL Bilateral 10/2018, 11/2018   • COLONOSCOPY  04/2013    Dr Melvin Galeano  Tubulovillous adenoma removed from the right colon   • COLONOSCOPY  05/2016    Dr Andres Penn  Diverticulosis   • EGD  05/2016    grade B esophagitis, 3 cm hiatal hernia, duodenal erosions    Gastric biopsy negative for H  pylori   • HAND TENDON SURGERY Right    • INGUINAL HERNIA REPAIR Bilateral    • SKIN CANCER EXCISION      basal cell carcinoma   • TRANSURETHRAL NEEDLE ABLATION OF THE PROSTATE     • TRANSURETHRAL RESECTION OF PROSTATE         Social History

## 2023-03-22 NOTE — TELEPHONE ENCOUNTER
Noted on Excel spreadsheet for DOS 9/22/23  LUPRON 45 mg - [employer] (verified) - NO prior auth required  Office stock okay to use  Please get 2023 ABN signed    [facility] 2/9/23

## 2023-09-18 ENCOUNTER — RA CDI HCC (OUTPATIENT)
Dept: OTHER | Facility: HOSPITAL | Age: 78
End: 2023-09-18

## 2023-09-18 NOTE — PROGRESS NOTES
[address] [facility] coding opportunities       Chart reviewed, no opportunity found: CHART REVIEWED, NO OPPORTUNITY FOUND        Patients Insurance        Commercial Insurance: Melvin Bradshaw

## 2023-09-21 ENCOUNTER — TELEPHONE (OUTPATIENT)
Dept: UROLOGY | Facility: MEDICAL CENTER | Age: 78
End: 2023-09-21

## 2023-09-21 DIAGNOSIS — Z85.46 HISTORY OF PROSTATE CANCER: Primary | ICD-10-CM

## 2023-09-22 ENCOUNTER — PROCEDURE VISIT (OUTPATIENT)
Dept: UROLOGY | Facility: MEDICAL CENTER | Age: 78
End: 2023-09-22
Payer: COMMERCIAL

## 2023-09-22 VITALS — DIASTOLIC BLOOD PRESSURE: 72 MMHG | BODY MASS INDEX: 26.69 KG/M2 | WEIGHT: 186 LBS | SYSTOLIC BLOOD PRESSURE: 138 MMHG

## 2023-09-22 DIAGNOSIS — Z85.46 HISTORY OF PROSTATE CANCER: Primary | ICD-10-CM

## 2023-09-22 DIAGNOSIS — R23.2 HOT FLASHES: ICD-10-CM

## 2023-09-22 DIAGNOSIS — C61 PROSTATE CANCER (HCC): ICD-10-CM

## 2023-09-22 PROCEDURE — 96402 CHEMO HORMON ANTINEOPL SQ/IM: CPT

## 2023-09-22 RX ORDER — MEGESTROL ACETATE 20 MG/1
20 TABLET ORAL DAILY
Qty: 90 TABLET | Refills: 3 | Status: SHIPPED | OUTPATIENT
Start: 2023-09-22

## 2023-09-22 NOTE — PROGRESS NOTES
9/22/2023  Yayo Masters is a 78 y.o. male  [id number]    Diagnosis:  Chief Complaint    Prostate Cancer         Patient presents for 6 month Lupron injection managed by Dr. Victorina Zepeda:  Return to the office in 6 months for FU and Lupron with Dr. SK[facility]      Medication administration:    45mg Lupron administered in right gluteal muscle. Pt tolerated well and band aid applied.      Administrations This Visit     leuprolide (LUPRON DEPOT 6 MONTH KIT) IM injection kit 45 mg     Admin Date  09/22/2023 Action  Given Dose  45 mg Route  Intramuscular Administered By  Raya Portillo RN                Vitals:    09/22/23 0931   BP: 138/72   BP Location: Left arm   Patient Position: Sitting   Cuff Size: Standard   Weight: 84.4 kg (186 lb)         Raya Portillo RN

## 2024-03-22 ENCOUNTER — TELEPHONE (OUTPATIENT)
Dept: UROLOGY | Facility: MEDICAL CENTER | Age: 79
End: 2024-03-22

## 2024-03-22 DIAGNOSIS — C61 PROSTATE CANCER (HCC): Primary | ICD-10-CM

## 2024-03-22 RX ORDER — VALSARTAN 80 MG/1
TABLET ORAL
COMMUNITY
Start: 2024-03-11

## 2024-03-25 ENCOUNTER — TELEPHONE (OUTPATIENT)
Dept: UROLOGY | Facility: MEDICAL CENTER | Age: 79
End: 2024-03-25

## 2024-03-25 ENCOUNTER — CLINICAL SUPPORT (OUTPATIENT)
Dept: UROLOGY | Facility: MEDICAL CENTER | Age: 79
End: 2024-03-25
Payer: COMMERCIAL

## 2024-03-25 VITALS
HEIGHT: 70 IN | DIASTOLIC BLOOD PRESSURE: 80 MMHG | BODY MASS INDEX: 27.49 KG/M2 | HEART RATE: 66 BPM | WEIGHT: 192 LBS | SYSTOLIC BLOOD PRESSURE: 160 MMHG

## 2024-03-25 DIAGNOSIS — C61 PROSTATE CANCER (HCC): Primary | ICD-10-CM

## 2024-03-25 PROCEDURE — 96402 CHEMO HORMON ANTINEOPL SQ/IM: CPT

## 2024-03-25 NOTE — PROGRESS NOTES
"3/25/2024  Bandar Nickerson is a 78 y.o. male  [id number]    Diagnosis:  Chief Complaint    Prostate Cancer         Patient presents for 6 month Lupron Injection  managed by Dr. Lisa    Plan:  Return in 6 months for fu with Dr. Lisa with 6 month Lupron Injection and PSA      Medication administration:    6 month 45 mg Lupron Injection administered into pt's left upper outer quadrant of buttock.  Pt tolerated well.  Bandaid applied.     Administrations This Visit       leuprolide (LUPRON DEPOT 6 MONTH KIT) IM injection kit 45 mg       Admin Date  03/25/2024 Action  Given Dose  45 mg Route  Intramuscular Documented By  Zahida Medina RN                    Vitals:    03/25/24 1033   BP: 160/80   Pulse: 66   Weight: 87.1 kg (192 lb)   Height: 5' 10\" (1.778 m)         Zahida Medina RN   "

## 2024-04-25 ENCOUNTER — TELEPHONE (OUTPATIENT)
Dept: NEUROLOGY | Facility: CLINIC | Age: 79
End: 2024-04-25

## 2024-04-25 NOTE — TELEPHONE ENCOUNTER
[place]  4/23 11:58 AM   Chuckie delarosa returning your phone message from earlier this morning, [phone].   __________    Returned call; reached , left message to please cb if needed (per chart review no documentation found pertaining to neurology)

## 2024-09-05 ENCOUNTER — RA CDI HCC (OUTPATIENT)
Dept: OTHER | Facility: HOSPITAL | Age: 79
End: 2024-09-05

## 2024-09-16 ENCOUNTER — OFFICE VISIT (OUTPATIENT)
Dept: UROLOGY | Facility: MEDICAL CENTER | Age: 79
End: 2024-09-16
Payer: COMMERCIAL

## 2024-09-16 VITALS
HEART RATE: 84 BPM | HEIGHT: 70 IN | DIASTOLIC BLOOD PRESSURE: 70 MMHG | OXYGEN SATURATION: 98 % | WEIGHT: 185 LBS | SYSTOLIC BLOOD PRESSURE: 132 MMHG | BODY MASS INDEX: 26.48 KG/M2

## 2024-09-16 DIAGNOSIS — Z79.818 ANDROGEN DEPRIVATION THERAPY: ICD-10-CM

## 2024-09-16 DIAGNOSIS — R23.2 HOT FLASHES: ICD-10-CM

## 2024-09-16 DIAGNOSIS — C61 PROSTATE CANCER (HCC): Primary | ICD-10-CM

## 2024-09-16 PROCEDURE — 96402 CHEMO HORMON ANTINEOPL SQ/IM: CPT

## 2024-09-16 PROCEDURE — 99213 OFFICE O/P EST LOW 20 MIN: CPT | Performed by: UROLOGY

## 2024-09-16 RX ORDER — NITROGLYCERIN 0.4 MG/1
1 TABLET SUBLINGUAL
COMMUNITY
Start: 2024-04-05 | End: 2025-04-05

## 2024-09-16 NOTE — PROGRESS NOTES
"   HISTORY:    Follow-up prostate cancer, started Lupron for biochemical recurrence in March 2022  Tolerates it well.    Moderate hot flashes, he takes Megace once per day and it helps.    PSA 0.02 in September 2024 at [facility]      Follow-up for bladder cancer, 2012 had a superficial high-grade noninvasive tumor.  Cysto all negative to date since that time.     2. Mild BPH, nocturia times 3-4, but he says he is not bothered by getting up at night.     3. Prostate cancer, radiation in early 2012.         ASSESSMENT / PLAN:    Doing well    He knows he can increase the dose of Megace if necessary    Lupron given today    Follow-up 6 months with Lupron and PSA    The following portions of the patient's history were reviewed and updated as appropriate: allergies, current medications, past family history, past medical history, past social history, past surgical history, and problem list.    Review of Systems      Objective:     Physical Exam      0   Lab Value Date/Time    PSA 0.8 07/09/2018 0651    PSA 0.7 03/02/2018 0834    PSA 1.0 01/09/2018 0903   ]  BUN   Date Value Ref Range Status   09/09/2024 25 7 - 28 mg/dL Final     Creatinine   Date Value Ref Range Status   09/09/2024 1.49 (H) 0.53 - 1.30 mg/dL Final     No components found for: \"CBC\"      Patient Active Problem List   Diagnosis    History of prostate cancer    History of bladder cancer    BPH with urinary obstruction    Urge incontinence        Diagnoses and all orders for this visit:    Prostate cancer (HCC)  -     leuprolide (LUPRON DEPOT 6 MONTH KIT) IM injection kit 45 mg  -     PSA Total, Diagnostic; Future    Androgen deprivation therapy    Hot flashes    Other orders  -     nitroglycerin (NITROSTAT) 0.4 mg SL tablet; Place 1 tablet under the tongue every 5 (five) minutes as needed  -     omeprazole (PriLOSEC) 20 mg delayed release capsule; Take 20 mg by mouth daily           Patient ID: Bandar Nickerson is a 79 y.o. male.      Current Outpatient Medications: "     levothyroxine 88 mcg tablet, Take 88 mcg by mouth, Disp: , Rfl:     megestrol (MEGACE) 20 mg tablet, Take 1 tablet (20 mg total) by mouth daily, Disp: 90 tablet, Rfl: 3    nitroglycerin (NITROSTAT) 0.4 mg SL tablet, Place 1 tablet under the tongue every 5 (five) minutes as needed, Disp: , Rfl:     omeprazole (PriLOSEC) 20 mg delayed release capsule, Take 20 mg by mouth daily, Disp: , Rfl:     valsartan (DIOVAN) 80 mg tablet, , Disp: , Rfl:     atorvastatin (LIPITOR) 10 mg tablet, , Disp: , Rfl:     famotidine (PEPCID) 10 mg tablet, Take 10 mg by mouth 2 (two) times a day (Patient not taking: Reported on 9/16/2024), Disp: , Rfl:     Melatonin 5 MG TABS, Take 5 mg by mouth as needed As need it for sleep (Patient not taking: Reported on 9/15/2022), Disp: , Rfl:     Multiple Vitamin (MULTIVITAMIN ADULT PO), Take 1 tablet by mouth daily (Patient not taking: Reported on 11/5/2021), Disp: , Rfl:     valsartan (DIOVAN) 40 mg tablet, Take 40 mg by mouth daily (Patient not taking: Reported on 3/25/2024), Disp: , Rfl:     Current Facility-Administered Medications:     leuprolide (LUPRON DEPOT 6 MONTH KIT) IM injection kit 45 mg, 45 mg, Intramuscular, Once,     Past Medical History:   Diagnosis Date    Asymptomatic microscopic hematuria     Feeling of incomplete bladder emptying     High serum creatinine     Hypertension     Hypothyroid     Malignant neoplasm of lateral wall of urinary bladder (HCC)     Malignant neoplasm of prostate (HCC)     Nocturia     Other microscopic hematuria     Poor urinary stream        Past Surgical History:   Procedure Laterality Date    CARDIAC CATHETERIZATION Right 07/2024    went through the wrist    CATARACT EXTRACTION, BILATERAL Bilateral 10/2018, 11/2018    COLONOSCOPY  04/2013    Dr. John.  Tubulovillous adenoma removed from the right colon    COLONOSCOPY  05/2016    Dr. Samaniego.  Diverticulosis    EGD  05/2016    grade B esophagitis, 3 cm hiatal hernia, duodenal erosions.  Gastric  biopsy negative for H. pylori    HAND TENDON SURGERY Right     INGUINAL HERNIA REPAIR Bilateral     SKIN CANCER EXCISION      basal cell carcinoma    TRANSURETHRAL NEEDLE ABLATION OF THE PROSTATE      TRANSURETHRAL RESECTION OF PROSTATE         Social History

## 2024-11-12 DIAGNOSIS — R23.2 HOT FLASHES: ICD-10-CM

## 2024-11-12 NOTE — TELEPHONE ENCOUNTER
Reason for call:   [x] Refill   [] Prior Auth  [] Other:     Office:   [] PCP/Provider -   [x] Specialty/Provider - Urology    Medication: Megestrol 20 mg, take 1 tablet by mouth daily        Ph[facility]: [facility]     Does the patient have enough for 3 days?   [x] Yes   [] No - Send as HP to POD

## 2024-11-13 RX ORDER — MEGESTROL ACETATE 20 MG/1
20 TABLET ORAL DAILY
Qty: 90 TABLET | Refills: 1 | Status: SHIPPED | OUTPATIENT
Start: 2024-11-13

## 2025-03-10 LAB — SL AMB PSA, TOTAL: 0.02 NG/ML

## 2025-03-24 ENCOUNTER — OFFICE VISIT (OUTPATIENT)
Dept: UROLOGY | Facility: MEDICAL CENTER | Age: 80
End: 2025-03-24
Payer: COMMERCIAL

## 2025-03-24 VITALS
WEIGHT: 190 LBS | OXYGEN SATURATION: 99 % | SYSTOLIC BLOOD PRESSURE: 128 MMHG | BODY MASS INDEX: 27.2 KG/M2 | HEIGHT: 70 IN | HEART RATE: 84 BPM | DIASTOLIC BLOOD PRESSURE: 66 MMHG

## 2025-03-24 DIAGNOSIS — Z85.51 HISTORY OF BLADDER CANCER: ICD-10-CM

## 2025-03-24 DIAGNOSIS — IMO0001 ANDROGEN DEPRIVATION THERAPY: ICD-10-CM

## 2025-03-24 DIAGNOSIS — C61 PROSTATE CANCER (HCC): Primary | ICD-10-CM

## 2025-03-24 DIAGNOSIS — Z79.818 ANDROGEN DEPRIVATION THERAPY: ICD-10-CM

## 2025-03-24 PROCEDURE — 99213 OFFICE O/P EST LOW 20 MIN: CPT | Performed by: UROLOGY

## 2025-03-24 PROCEDURE — 96402 CHEMO HORMON ANTINEOPL SQ/IM: CPT

## 2025-03-24 RX ORDER — EZETIMIBE 10 MG/1
TABLET ORAL
COMMUNITY
Start: 2025-03-18

## 2025-03-24 NOTE — PROGRESS NOTES
"   HISTORY:    Follow-up prostate cancer, radiation therapy in 2012.    Started Lupron for biochemical recurrence in March 2022  Tolerates it well.     Moderate hot flashes, he takes Megace once per day and it helps.      Follow-up for bladder cancer, 2012 had a superficial high-grade noninvasive tumor.  Cysto all negative to date since that time.     2. Mild BPH, nocturia times 3-4, but he says he is not bothered by getting up at night.            ASSESSMENT / PLAN:    Lupron given today    Doing well    Follow-up 6 months with Lupron and PSA        Review of Systems      Objective:     Physical Exam  Abdominal:      Comments: Abdomen soft nontender    Penis testes some atrophy           0   Lab Value Date/Time    PSA 0.02 03/10/2025 0818    PSA 0.8 07/09/2018 0651    PSA 0.7 03/02/2018 0834    PSA 1.0 01/09/2018 0903   ]  BUN   Date Value Ref Range Status   03/10/2025 27 7 - 28 mg/dL Final     Creatinine   Date Value Ref Range Status   03/10/2025 1.46 (H) 0.53 - 1.30 mg/dL Final     No components found for: \"CBC\"    Patient Active Problem List   Diagnosis    History of prostate cancer    History of bladder cancer    BPH with urinary obstruction    Urge incontinence        Patient ID: Bandar Nickerson is a 79 y.o. male.      Current Outpatient Medications:     ezetimibe (ZETIA) 10 mg tablet, , Disp: , Rfl:     levothyroxine 88 mcg tablet, Take 88 mcg by mouth, Disp: , Rfl:     megestrol (MEGACE) 20 mg tablet, Take 1 tablet (20 mg total) by mouth daily, Disp: 90 tablet, Rfl: 1    omeprazole (PriLOSEC) 20 mg delayed release capsule, Take 20 mg by mouth daily, Disp: , Rfl:     valsartan (DIOVAN) 80 mg tablet, , Disp: , Rfl:     atorvastatin (LIPITOR) 10 mg tablet, , Disp: , Rfl:     famotidine (PEPCID) 10 mg tablet, Take 10 mg by mouth 2 (two) times a day (Patient not taking: Reported on 9/16/2024), Disp: , Rfl:     Melatonin 5 MG TABS, Take 5 mg by mouth as needed As need it for sleep (Patient not taking: Reported on " 9/15/2022), Disp: , Rfl:     Multiple Vitamin (MULTIVITAMIN ADULT PO), Take 1 tablet by mouth daily (Patient not taking: Reported on 11/5/2021), Disp: , Rfl:     nitroglycerin (NITROSTAT) 0.4 mg SL tablet, Place 1 tablet under the tongue every 5 (five) minutes as needed (Patient not taking: Reported on 3/24/2025), Disp: , Rfl:     valsartan (DIOVAN) 40 mg tablet, Take 40 mg by mouth daily (Patient not taking: Reported on 3/25/2024), Disp: , Rfl:

## 2025-05-13 DIAGNOSIS — R23.2 HOT FLASHES: ICD-10-CM

## 2025-05-13 RX ORDER — MEGESTROL ACETATE 20 MG/1
20 TABLET ORAL DAILY
Qty: 90 TABLET | Refills: 0 | Status: SHIPPED | OUTPATIENT
Start: 2025-05-13

## 2025-05-13 NOTE — TELEPHONE ENCOUNTER
Reason for call:   [x] Refill   [] Prior Auth  [] Other:     Office:   [] PCP/Provider -   [x] Specialty/Provider - Urology     Medication: Megestrol     Dose/Frequency: 20 mg tablet taken by mouth once daily     Quantity: 90    Pharmacy: [id number] [place], [place] - [address] [phone]     Local Pharmacy   Does the patient have enough for 3 days?   [x] Yes   [] No - Send as HP to POD    Mail Away Pharmacy   Does the patient have enough for 10 days?   [] Yes   [] No - Send as HP to POD